# Patient Record
Sex: FEMALE | Race: WHITE | Employment: FULL TIME | URBAN - METROPOLITAN AREA
[De-identification: names, ages, dates, MRNs, and addresses within clinical notes are randomized per-mention and may not be internally consistent; named-entity substitution may affect disease eponyms.]

---

## 2021-04-08 NOTE — PROGRESS NOTES
Assessment and Plan   Diagnoses and all orders for this visit:    1. Spasm of throat  -     REFERRAL TO GASTROENTEROLOGY  -     XR SWALLOW FUN VIDEO; Future  Several year history of feeling like a spasm in her upper throat after eating solid food. Worse if she tries to eat quickly. Occasionally associated with vomiting. Feels like a tightness. Does not occur with liquids. Also feels an uncomfortable sensation in her chest.  Feels symptoms occur shortly after swallowing. Not associated with coughing. Endorses dry eyes but wears contacts. No dry mouth. No Raynaud's. No changes in speech. No fever, chills, night sweats, unexpected weight loss. Unclear etiology. Recommend starting out with modified barium swallow. Consider GI referral for EGD. 2. Vegetarian  -     CBC WITH AUTOMATED DIFF; Future  -     VITAMIN B12; Future  -     VITAMIN D, 25 HYDROXY; Future  -     IRON PROFILE; Future  -     FERRITIN; Future    3. Routine adult health maintenance  -     HEMOGLOBIN A1C WITH EAG; Future  -     METABOLIC PANEL, COMPREHENSIVE; Future  -     LIPID PANEL; Future    4. Need for hepatitis C screening test  -     HEPATITIS C AB; Future    5. Webb's neuroma of left foot  Followed by Agnieszka Yeung. Was recently in a boot    6. Acne vulgaris  Followed by Dr. Keny Jean. On spironolactone, topical clindamycin and Retin-A. No medication changes recommended    7. Allergic rhinitis, unspecified seasonality, unspecified trigger  On Claritin as needed    8. Irritable bowel syndrome with diarrhea  Years long issue. Had a colonoscopy several years ago which was normal.  Symptoms worse with meat and dairy. Manages with diet changes. Benefits, risks, possible drug interactions, and side effects of all new medications were reviewed with the patient. Pt verbalized understanding. Return to clinic: Pending barium swallow    An electronic signature was used to authenticate this note.   Mikhail Thorpe, MD  [facility]  4/9/2021    No future appointments. History of Present Illness   Chief Complaint   Establish care    Ashwini Carias is a 30 y.o. female         Review of Systems   Constitutional: Negative for chills and fever. HENT: Negative for hearing loss. Eyes: Negative for blurred vision. Respiratory: Negative for shortness of breath. Cardiovascular: Negative for chest pain. Gastrointestinal: Negative for abdominal pain, blood in stool, constipation, diarrhea, melena, nausea and vomiting. Genitourinary: Negative for dysuria and hematuria. Musculoskeletal: Negative for joint pain. Skin: Negative for rash. Neurological: Negative for headaches. Past Medical History   No Known Allergies     Current Outpatient Medications   Medication Sig    multivitamin capsule Take 1 Cap by mouth daily. Vegan    loratadine (Claritin) 10 mg tablet Take 10 mg by mouth.  spironolactone (ALDACTONE) 25 mg tablet TAKE 1 TABLET BY MOUTH DAILY    tretinoin (RETIN-A) 0.025 % topical cream APPLY A PEA SIZE AMOUNT TO FACE AT BEDTIME    clindamycin (CLEOCIN T) 1 % lotion APPLY EXTERNALLY TO FACE 1 TO 2 TIMES DAILY     No current facility-administered medications for this visit. Patient Active Problem List   Diagnosis Code    Webb's neuroma of left foot G57.62    Spasm of throat J39.2    Vegetarian Z78.9    Acne vulgaris L70.0    Allergic rhinitis, unspecified seasonality, unspecified trigger J30.9    Irritable bowel syndrome with diarrhea K58.0     Past Surgical History:   Procedure Laterality Date    HX BREAST AUGMENTATION  2009    HX OTHER SURGICAL      spots removed on skin - multiple sites - by dermatologist.   [place]ga WISDOM TEETH EXTRACTION  2011      Social History     Tobacco Use    Smoking status: Former Smoker    Smokeless tobacco: Never Used    Tobacco comment: Quit 8 years ago; 1ppd x 8 years   Substance Use Topics    Alcohol use:  Yes Alcohol/week: 2.0 standard drinks     Types: 1 Glasses of wine, 1 Cans of beer per week      Family History   Problem Relation Age of Onset    Cancer Father         Skin cancer    Hypertension Father     Heart Attack Father     High Cholesterol Father     Alzheimer Paternal Grandfather     Alzheimer Maternal Grandfather         Physical Exam   Vitals:       Visit Vitals  BP 98/64 (BP 1 Location: Left upper arm, BP Patient Position: Sitting, BP Cuff Size: Adult)   Pulse (!) 102   Temp 97.6 °F (36.4 °C) (Oral)   Resp 14   Ht 5' (1.524 m)   Wt 129 lb 6.4 oz (58.7 kg)   LMP 03/24/2021   SpO2 100%   BMI 25.27 kg/m²        Physical Exam  Constitutional:       General: She is not in acute distress. Appearance: She is well-developed. HENT:      Right Ear: Tympanic membrane, ear canal and external ear normal.      Left Ear: Tympanic membrane, ear canal and external ear normal.      Mouth/Throat:      Mouth: Mucous membranes are moist.      Pharynx: No posterior oropharyngeal erythema. Eyes:      Extraocular Movements: Extraocular movements intact. Conjunctiva/sclera: Conjunctivae normal.   Neck:      Musculoskeletal: Neck supple. Comments: No masses  Cardiovascular:      Rate and Rhythm: Normal rate and regular rhythm. Pulses: Normal pulses. Heart sounds: No murmur. No friction rub. No gallop. Pulmonary:      Effort: No respiratory distress. Breath sounds: No wheezing, rhonchi or rales. Abdominal:      General: Bowel sounds are normal. There is no distension. Palpations: Abdomen is soft. There is no hepatomegaly, splenomegaly or mass. Tenderness: There is no abdominal tenderness. There is no guarding. Lymphadenopathy:      Cervical: No cervical adenopathy. Skin:     General: Skin is warm. Findings: No rash. Neurological:      Mental Status: She is alert.

## 2021-04-09 ENCOUNTER — OFFICE VISIT (OUTPATIENT)
Dept: INTERNAL MEDICINE CLINIC | Age: 36
End: 2021-04-09
Payer: COMMERCIAL

## 2021-04-09 VITALS
SYSTOLIC BLOOD PRESSURE: 98 MMHG | WEIGHT: 129.4 LBS | HEIGHT: 60 IN | DIASTOLIC BLOOD PRESSURE: 64 MMHG | TEMPERATURE: 97.6 F | RESPIRATION RATE: 14 BRPM | HEART RATE: 102 BPM | OXYGEN SATURATION: 100 % | BODY MASS INDEX: 25.4 KG/M2

## 2021-04-09 DIAGNOSIS — K58.0 IRRITABLE BOWEL SYNDROME WITH DIARRHEA: ICD-10-CM

## 2021-04-09 DIAGNOSIS — J30.9 ALLERGIC RHINITIS, UNSPECIFIED SEASONALITY, UNSPECIFIED TRIGGER: ICD-10-CM

## 2021-04-09 DIAGNOSIS — G57.62 MORTON'S NEUROMA OF LEFT FOOT: ICD-10-CM

## 2021-04-09 DIAGNOSIS — J39.2 SPASM OF THROAT: Primary | ICD-10-CM

## 2021-04-09 DIAGNOSIS — Z78.9 VEGETARIAN: ICD-10-CM

## 2021-04-09 DIAGNOSIS — Z11.59 NEED FOR HEPATITIS C SCREENING TEST: ICD-10-CM

## 2021-04-09 DIAGNOSIS — L70.0 ACNE VULGARIS: ICD-10-CM

## 2021-04-09 DIAGNOSIS — Z00.00 ROUTINE ADULT HEALTH MAINTENANCE: ICD-10-CM

## 2021-04-09 LAB
25(OH)D3 SERPL-MCNC: 17.2 NG/ML (ref 30–100)
ALBUMIN SERPL-MCNC: 4 G/DL (ref 3.5–5)
ALBUMIN/GLOB SERPL: 1.5 {RATIO} (ref 1.1–2.2)
ALP SERPL-CCNC: 45 U/L (ref 45–117)
ALT SERPL-CCNC: 17 U/L (ref 12–78)
ANION GAP SERPL CALC-SCNC: 3 MMOL/L (ref 5–15)
AST SERPL-CCNC: 13 U/L (ref 15–37)
BASOPHILS # BLD: 0.1 K/UL (ref 0–0.1)
BASOPHILS NFR BLD: 1 % (ref 0–1)
BILIRUB SERPL-MCNC: 0.4 MG/DL (ref 0.2–1)
BUN SERPL-MCNC: 8 MG/DL (ref 6–20)
BUN/CREAT SERPL: 12 (ref 12–20)
CALCIUM SERPL-MCNC: 9.4 MG/DL (ref 8.5–10.1)
CHLORIDE SERPL-SCNC: 106 MMOL/L (ref 97–108)
CHOLEST SERPL-MCNC: 173 MG/DL
CO2 SERPL-SCNC: 30 MMOL/L (ref 21–32)
CREAT SERPL-MCNC: 0.68 MG/DL (ref 0.55–1.02)
DIFFERENTIAL METHOD BLD: NORMAL
EOSINOPHIL # BLD: 0.3 K/UL (ref 0–0.4)
EOSINOPHIL NFR BLD: 5 % (ref 0–7)
ERYTHROCYTE [DISTWIDTH] IN BLOOD BY AUTOMATED COUNT: 11.9 % (ref 11.5–14.5)
EST. AVERAGE GLUCOSE BLD GHB EST-MCNC: 91 MG/DL
FERRITIN SERPL-MCNC: 24 NG/ML (ref 26–388)
GLOBULIN SER CALC-MCNC: 2.7 G/DL (ref 2–4)
GLUCOSE SERPL-MCNC: 71 MG/DL (ref 65–100)
HBA1C MFR BLD: 4.8 % (ref 4–5.6)
HCT VFR BLD AUTO: 42.9 % (ref 35–47)
HCV AB SERPL QL IA: NONREACTIVE
HCV COMMENT,HCGAC: NORMAL
HDLC SERPL-MCNC: 84 MG/DL
HDLC SERPL: 2.1 {RATIO} (ref 0–5)
HGB BLD-MCNC: 13.9 G/DL (ref 11.5–16)
IMM GRANULOCYTES # BLD AUTO: 0 K/UL (ref 0–0.04)
IMM GRANULOCYTES NFR BLD AUTO: 0 % (ref 0–0.5)
IRON SATN MFR SERPL: 32 % (ref 20–50)
IRON SERPL-MCNC: 101 UG/DL (ref 35–150)
LDLC SERPL CALC-MCNC: 83 MG/DL (ref 0–100)
LIPID PROFILE,FLP: NORMAL
LYMPHOCYTES # BLD: 1.8 K/UL (ref 0.8–3.5)
LYMPHOCYTES NFR BLD: 25 % (ref 12–49)
MCH RBC QN AUTO: 30.5 PG (ref 26–34)
MCHC RBC AUTO-ENTMCNC: 32.4 G/DL (ref 30–36.5)
MCV RBC AUTO: 94.1 FL (ref 80–99)
MONOCYTES # BLD: 0.5 K/UL (ref 0–1)
MONOCYTES NFR BLD: 8 % (ref 5–13)
NEUTS SEG # BLD: 4.4 K/UL (ref 1.8–8)
NEUTS SEG NFR BLD: 61 % (ref 32–75)
NRBC # BLD: 0 K/UL (ref 0–0.01)
NRBC BLD-RTO: 0 PER 100 WBC
PLATELET # BLD AUTO: 370 K/UL (ref 150–400)
PMV BLD AUTO: 9.5 FL (ref 8.9–12.9)
POTASSIUM SERPL-SCNC: 4.1 MMOL/L (ref 3.5–5.1)
PROT SERPL-MCNC: 6.7 G/DL (ref 6.4–8.2)
RBC # BLD AUTO: 4.56 M/UL (ref 3.8–5.2)
SODIUM SERPL-SCNC: 139 MMOL/L (ref 136–145)
TIBC SERPL-MCNC: 318 UG/DL (ref 250–450)
TRIGL SERPL-MCNC: 30 MG/DL (ref ?–150)
VIT B12 SERPL-MCNC: 478 PG/ML (ref 193–986)
VLDLC SERPL CALC-MCNC: 6 MG/DL
WBC # BLD AUTO: 7.1 K/UL (ref 3.6–11)

## 2021-04-09 PROCEDURE — 99204 OFFICE O/P NEW MOD 45 MIN: CPT | Performed by: INTERNAL MEDICINE

## 2021-04-09 RX ORDER — SPIRONOLACTONE 25 MG/1
TABLET ORAL
COMMUNITY
Start: 2021-03-04

## 2021-04-09 RX ORDER — TRETINOIN 0.25 MG/G
CREAM TOPICAL
COMMUNITY
Start: 2021-03-04

## 2021-04-09 RX ORDER — CLINDAMYCIN PHOSPHATE 10 UG/ML
LOTION TOPICAL
COMMUNITY
Start: 2021-03-04

## 2021-04-09 RX ORDER — LORATADINE 10 MG/1
10 TABLET ORAL
COMMUNITY

## 2021-04-09 RX ORDER — MULTIVITAMIN
1 CAPSULE ORAL DAILY
COMMUNITY

## 2021-04-12 ENCOUNTER — TELEPHONE (OUTPATIENT)
Dept: INTERNAL MEDICINE CLINIC | Age: 36
End: 2021-04-12

## 2021-04-12 DIAGNOSIS — E55.9 VITAMIN D DEFICIENCY: Primary | ICD-10-CM

## 2021-04-12 DIAGNOSIS — E61.1 IRON DEFICIENCY: ICD-10-CM

## 2021-04-12 RX ORDER — CHOLECALCIFEROL TAB 125 MCG (5000 UNIT) 125 MCG
5000 TAB ORAL DAILY
Qty: 90 TAB | Refills: 3 | Status: SHIPPED | OUTPATIENT
Start: 2021-04-12

## 2021-04-12 RX ORDER — FERROUS SULFATE 325(65) MG
325 TABLET, DELAYED RELEASE (ENTERIC COATED) ORAL DAILY
Qty: 90 TAB | Refills: 3 | Status: SHIPPED | OUTPATIENT
Start: 2021-04-12

## 2021-04-12 NOTE — PROGRESS NOTES
Please call the pt let her know that her iron and vitamin D are low. I recommend starting supplements, which I have sent to her pharmacy. Letter will be sent.  =  Hep C negative. Ferritin level 24. Iron saturation normal.  Vitamin D low 17.2.   Vitamin B12 normal.  Lipid panel normal.  CMP normal.  A1c normal.  CBC normal.  Iron likely diet related

## 2021-04-12 NOTE — TELEPHONE ENCOUNTER
Call made to patient to advise of providers message regarding low iron and vit D rx has been sent to pharmacy. Advised for a call back with any concerns or questions.

## 2021-04-12 NOTE — TELEPHONE ENCOUNTER
----- Message from Mikhail Thorpe MD sent at 8/67/1493  1:19 PM EDT -----  Please call the pt let her know that her iron and vitamin D are low. I recommend starting supplements, which I have sent to her pharmacy. Letter will be sent.  =  Hep C negative. Ferritin level 24. Iron saturation normal.  Vitamin D low 17.2.   Vitamin B12 normal.  Lipid panel normal.  CMP normal.  A1c normal.  CBC normal.  Iron likely diet related

## 2021-04-12 NOTE — TELEPHONE ENCOUNTER
----- Message from Suman Nguyen sent at 4/12/2021  2:53 PM EDT -----  Regarding: Dr Hutson/ telephone  Patient return call    Caller's first and last name and relationship (if not the patient):Pt      Best contact number(s):[phone]       Whose call is being returned: Nurse/ office      Details to clarify the request:Pt is requesting a call back to obtain reason for call and advised she missed a call, but no message was left.       Suman Nguyen

## 2021-04-13 NOTE — TELEPHONE ENCOUNTER
Return call made to patient to advise of providers message. Patient verbalized understanding and was thankful for the call back.

## 2022-03-18 PROBLEM — J39.2 SPASM OF THROAT: Status: ACTIVE | Noted: 2021-04-09

## 2022-03-18 PROBLEM — E61.1 IRON DEFICIENCY: Status: ACTIVE | Noted: 2021-04-12

## 2022-03-18 PROBLEM — L70.0 ACNE VULGARIS: Status: ACTIVE | Noted: 2021-04-09

## 2022-03-19 PROBLEM — J30.9 ALLERGIC RHINITIS, UNSPECIFIED SEASONALITY, UNSPECIFIED TRIGGER: Status: ACTIVE | Noted: 2021-04-09

## 2022-03-19 PROBLEM — E55.9 VITAMIN D DEFICIENCY: Status: ACTIVE | Noted: 2021-04-12

## 2022-03-20 PROBLEM — Z78.9 VEGETARIAN: Status: ACTIVE | Noted: 2021-04-09

## 2022-03-20 PROBLEM — K58.0 IRRITABLE BOWEL SYNDROME WITH DIARRHEA: Status: ACTIVE | Noted: 2021-04-09

## 2023-05-15 RX ORDER — CLINDAMYCIN PHOSPHATE 10 UG/ML
LOTION TOPICAL
COMMUNITY
Start: 2021-03-04

## 2023-05-15 RX ORDER — LORATADINE 10 MG/1
10 TABLET ORAL
COMMUNITY

## 2023-05-15 RX ORDER — LANOLIN ALCOHOL/MO/W.PET/CERES
325 CREAM (GRAM) TOPICAL DAILY
COMMUNITY
Start: 2021-04-12

## 2023-05-15 RX ORDER — SPIRONOLACTONE 25 MG/1
1 TABLET ORAL DAILY
COMMUNITY
Start: 2021-03-04